# Patient Record
Sex: MALE | Race: WHITE | NOT HISPANIC OR LATINO
[De-identification: names, ages, dates, MRNs, and addresses within clinical notes are randomized per-mention and may not be internally consistent; named-entity substitution may affect disease eponyms.]

---

## 2019-06-08 ENCOUNTER — TRANSCRIPTION ENCOUNTER (OUTPATIENT)
Age: 59
End: 2019-06-08

## 2020-03-11 ENCOUNTER — TRANSCRIPTION ENCOUNTER (OUTPATIENT)
Age: 60
End: 2020-03-11

## 2020-03-11 PROBLEM — Z00.00 ENCOUNTER FOR PREVENTIVE HEALTH EXAMINATION: Status: ACTIVE | Noted: 2020-03-11

## 2020-04-01 ENCOUNTER — APPOINTMENT (OUTPATIENT)
Dept: NEUROSURGERY | Facility: CLINIC | Age: 60
End: 2020-04-01

## 2020-07-22 ENCOUNTER — APPOINTMENT (OUTPATIENT)
Dept: NEUROSURGERY | Facility: CLINIC | Age: 60
End: 2020-07-22
Payer: MEDICARE

## 2020-07-22 VITALS — HEIGHT: 67 IN | WEIGHT: 170 LBS | BODY MASS INDEX: 26.68 KG/M2

## 2020-07-22 DIAGNOSIS — M47.812 SPONDYLOSIS W/OUT MYELOPATHY OR RADICULOPATHY, CERVICAL REGION: ICD-10-CM

## 2020-07-22 DIAGNOSIS — Z78.9 OTHER SPECIFIED HEALTH STATUS: ICD-10-CM

## 2020-07-22 DIAGNOSIS — Z85.038 PERSONAL HISTORY OF OTHER MALIGNANT NEOPLASM OF LARGE INTESTINE: ICD-10-CM

## 2020-07-22 DIAGNOSIS — M47.816 SPONDYLOSIS W/OUT MYELOPATHY OR RADICULOPATHY, LUMBAR REGION: ICD-10-CM

## 2020-07-22 DIAGNOSIS — G56.23 LESION OF ULNAR NERVE, BILATERAL UPPER LIMBS: ICD-10-CM

## 2020-07-22 PROCEDURE — 99204 OFFICE O/P NEW MOD 45 MIN: CPT

## 2020-07-22 RX ORDER — IBUPROFEN AND FAMOTIDINE 800; 26.6 MG/1; MG/1
TABLET, COATED ORAL
Refills: 0 | Status: ACTIVE | COMMUNITY

## 2020-07-22 RX ORDER — FEXOFENADINE HCL 60 MG
CAPSULE ORAL
Refills: 0 | Status: ACTIVE | COMMUNITY

## 2020-07-22 RX ORDER — ATORVASTATIN CALCIUM 20 MG/1
20 TABLET, FILM COATED ORAL
Refills: 0 | Status: ACTIVE | COMMUNITY

## 2020-07-22 RX ORDER — ASPIRIN 325 MG
81 TABLET ORAL
Refills: 0 | Status: ACTIVE | COMMUNITY

## 2020-07-22 NOTE — PHYSICAL EXAM
[General Appearance - Alert] : alert [General Appearance - In No Acute Distress] : in no acute distress [Oriented To Time, Place, And Person] : oriented to person, place, and time [Impaired Insight] : insight and judgment were intact [Affect] : the affect was normal [Cranial Nerves Optic (II)] : visual acuity intact bilaterally,  pupils equal round and reactive to light [Cranial Nerves Oculomotor (III)] : extraocular motion intact [Cranial Nerves Trigeminal (V)] : facial sensation intact symmetrically [Cranial Nerves Vestibulocochlear (VIII)] : hearing was intact bilaterally [Cranial Nerves Glossopharyngeal (IX)] : tongue and palate midline [Cranial Nerves Facial (VII)] : face symmetrical [Cranial Nerves Hypoglossal (XII)] : there was no tongue deviation with protrusion [Cranial Nerves Accessory (XI - Cranial And Spinal)] : head turning and shoulder shrug symmetric [Abnormal Walk] : normal gait [Left  Paraspinal ___ (level)] : ~Ulevel [unfilled] left paraspinal [Right Paraspinal ___ (level)] : ~Ulevel [unfilled] right paraspinal [Normal] : normal [Restricted] : was restricted [Intact] : all sensory within normal limits bilaterally [Moreno] : Moreno's sign was not demonstrated [Romberg's Sign] : Romberg's sign was negtive [FreeTextEntry6] : Motor exam is intact, + Atrophy is noted in the right hand, + tinels B/L elbows.  [Straight-Leg Raise Test - Left] : straight leg raise of the left leg was negative [Straight-Leg Raise Test - Right] : straight leg raise  of the right leg was negative

## 2020-09-02 ENCOUNTER — APPOINTMENT (OUTPATIENT)
Dept: NEUROSURGERY | Facility: CLINIC | Age: 60
End: 2020-09-02

## 2021-02-21 ENCOUNTER — APPOINTMENT (OUTPATIENT)
Dept: DISASTER EMERGENCY | Facility: CLINIC | Age: 61
End: 2021-02-21

## 2021-02-21 ENCOUNTER — TRANSCRIPTION ENCOUNTER (OUTPATIENT)
Age: 61
End: 2021-02-21

## 2021-02-21 ENCOUNTER — OUTPATIENT (OUTPATIENT)
Dept: OUTPATIENT SERVICES | Facility: HOSPITAL | Age: 61
LOS: 1 days | Discharge: HOME | End: 2021-02-21

## 2021-02-21 VITALS
OXYGEN SATURATION: 99 % | DIASTOLIC BLOOD PRESSURE: 90 MMHG | RESPIRATION RATE: 16 BRPM | TEMPERATURE: 97 F | HEART RATE: 93 BPM | SYSTOLIC BLOOD PRESSURE: 131 MMHG

## 2021-02-21 VITALS
RESPIRATION RATE: 16 BRPM | HEART RATE: 75 BPM | OXYGEN SATURATION: 100 % | DIASTOLIC BLOOD PRESSURE: 61 MMHG | SYSTOLIC BLOOD PRESSURE: 125 MMHG | TEMPERATURE: 97 F

## 2021-02-21 DIAGNOSIS — U07.1 COVID-19: ICD-10-CM

## 2021-02-21 RX ORDER — BAMLANIVIMAB 35 MG/ML
700 INJECTION, SOLUTION INTRAVENOUS ONCE
Refills: 0 | Status: COMPLETED | OUTPATIENT
Start: 2021-02-21 | End: 2021-02-21

## 2021-02-21 RX ORDER — ACETAMINOPHEN 500 MG
650 TABLET ORAL ONCE
Refills: 0 | Status: DISCONTINUED | OUTPATIENT
Start: 2021-02-21 | End: 2021-02-21

## 2021-02-21 RX ORDER — SODIUM CHLORIDE 9 MG/ML
250 INJECTION INTRAMUSCULAR; INTRAVENOUS; SUBCUTANEOUS
Refills: 0 | Status: DISCONTINUED | OUTPATIENT
Start: 2021-02-21 | End: 2021-02-21

## 2021-02-21 RX ADMIN — BAMLANIVIMAB 270 MILLIGRAM(S): 35 INJECTION, SOLUTION INTRAVENOUS at 14:52

## 2021-02-21 NOTE — CHART NOTE - NSCHARTNOTEFT_GEN_A_CORE
Medicine Progress Note    Patient is a 60y old  Male who presents with a chief complaint of Covid+. Pt offers no complaints at time     MEDICATIONS  (STANDING):  sodium chloride 0.9%. 250 milliLiter(s) (25 mL/Hr) IV Continuous <Continuous>    MEDICATIONS  (PRN):  acetaminophen   Tablet .. 650 milliGRAM(s) Oral once PRN Temp greater or equal to 38C (100.4F)    PHYSICAL EXAM:  Vital Signs Last 24 Hrs  T(C): 36.3 (21 Feb 2021 15:45), Max: 36.7 (21 Feb 2021 14:00)  T(F): 97.3 (21 Feb 2021 15:45), Max: 98.1 (21 Feb 2021 14:00)  HR: 75 (21 Feb 2021 15:45) (75 - 93)  BP: 125/61 (21 Feb 2021 15:45) (111/68 - 131/90)  BP(mean): --  RR: 16 (21 Feb 2021 15:45) (16 - 16)  SpO2: 100% (21 Feb 2021 15:45) (98% - 100%)    CONSTITUTIONAL: NAD, well-developed, well-groomed  ENMT: Moist oral mucosa, no pharyngeal injection or exudates; normal dentition  RESPIRATORY: Normal respiratory effort; lungs are clear to auscultation bilaterally  CARDIOVASCULAR: Regular rate and rhythm, normal S1 and S2, no murmur/rub/gallop; No lower extremity edema; Peripheral pulses are 2+ bilaterally  ABDOMEN: Nontender to palpation, normoactive bowel sounds, no rebound/guarding; No hepatosplenomegaly  PSYCH: A+O to person, place, and time; affect appropriate  NEUROLOGY: CN 2-12 are intact and symmetric; no gross sensory deficits   SKIN: No rashes; no palpable lesions    Patient COVID + with risk factors s/p Monoclonal Antibody infusion without complications    -Tyl  temp prn >100.4  -If worsening of condition present to ED

## 2021-02-22 ENCOUNTER — TRANSCRIPTION ENCOUNTER (OUTPATIENT)
Age: 61
End: 2021-02-22

## 2021-03-01 ENCOUNTER — TRANSCRIPTION ENCOUNTER (OUTPATIENT)
Age: 61
End: 2021-03-01

## 2021-12-31 ENCOUNTER — TRANSCRIPTION ENCOUNTER (OUTPATIENT)
Age: 61
End: 2021-12-31

## 2023-02-11 ENCOUNTER — NON-APPOINTMENT (OUTPATIENT)
Age: 63
End: 2023-02-11

## 2025-01-02 ENCOUNTER — NON-APPOINTMENT (OUTPATIENT)
Age: 65
End: 2025-01-02